# Patient Record
Sex: FEMALE | Race: OTHER | Employment: UNEMPLOYED | URBAN - METROPOLITAN AREA
[De-identification: names, ages, dates, MRNs, and addresses within clinical notes are randomized per-mention and may not be internally consistent; named-entity substitution may affect disease eponyms.]

---

## 2016-10-17 LAB
HBSAG, EXTERNAL: NEGATIVE
HIV, EXTERNAL: NON REACTIVE
RUBELLA, EXTERNAL: NORMAL
TYPE, ABO & RH, EXTERNAL: NORMAL

## 2016-10-26 LAB
CHLAMYDIA, EXTERNAL: NEGATIVE
N. GONORRHEA, EXTERNAL: NEGATIVE

## 2016-11-17 LAB
ANTIBODY SCREEN, EXTERNAL: NEGATIVE
T. PALLIDUM, EXTERNAL: NEGATIVE

## 2016-12-07 LAB — GRBS, EXTERNAL: POSITIVE

## 2017-01-05 ENCOUNTER — HOSPITAL ENCOUNTER (INPATIENT)
Age: 27
LOS: 2 days | Discharge: HOME | DRG: 560 | End: 2017-01-07
Attending: OBSTETRICS & GYNECOLOGY | Admitting: OBSTETRICS & GYNECOLOGY
Payer: MEDICAID

## 2017-01-05 PROBLEM — Z34.90 PREGNANCY: Status: ACTIVE | Noted: 2017-01-05

## 2017-01-05 LAB
BASOPHILS # BLD AUTO: 0 K/UL (ref 0–0.1)
BASOPHILS # BLD: 0 % (ref 0–1)
EOSINOPHIL # BLD: 0.1 K/UL (ref 0–0.4)
EOSINOPHIL NFR BLD: 1 % (ref 0–7)
ERYTHROCYTE [DISTWIDTH] IN BLOOD BY AUTOMATED COUNT: 14 % (ref 11.5–14.5)
HCT VFR BLD AUTO: 37.4 % (ref 35–47)
HGB BLD-MCNC: 12.8 G/DL (ref 11.5–16)
LYMPHOCYTES # BLD AUTO: 20 % (ref 12–49)
LYMPHOCYTES # BLD: 2.6 K/UL (ref 0.8–3.5)
MCH RBC QN AUTO: 30.5 PG (ref 26–34)
MCHC RBC AUTO-ENTMCNC: 34.2 G/DL (ref 30–36.5)
MCV RBC AUTO: 89.3 FL (ref 80–99)
MONOCYTES # BLD: 1 K/UL (ref 0–1)
MONOCYTES NFR BLD AUTO: 8 % (ref 5–13)
NEUTS SEG # BLD: 9 K/UL (ref 1.8–8)
NEUTS SEG NFR BLD AUTO: 71 % (ref 32–75)
PLATELET # BLD AUTO: 197 K/UL (ref 150–400)
RBC # BLD AUTO: 4.19 M/UL (ref 3.8–5.2)
WBC # BLD AUTO: 12.8 K/UL (ref 3.6–11)

## 2017-01-05 PROCEDURE — 74011000258 HC RX REV CODE- 258

## 2017-01-05 PROCEDURE — 74011250636 HC RX REV CODE- 250/636: Performed by: OBSTETRICS & GYNECOLOGY

## 2017-01-05 PROCEDURE — 65410000002 HC RM PRIVATE OB

## 2017-01-05 PROCEDURE — 75410000002 HC LABOR FEE PER 1 HR

## 2017-01-05 PROCEDURE — 77030021125

## 2017-01-05 PROCEDURE — 85025 COMPLETE CBC W/AUTO DIFF WBC: CPT | Performed by: OBSTETRICS & GYNECOLOGY

## 2017-01-05 PROCEDURE — 75410000000 HC DELIVERY VAGINAL/SINGLE

## 2017-01-05 PROCEDURE — 74011250637 HC RX REV CODE- 250/637: Performed by: OBSTETRICS & GYNECOLOGY

## 2017-01-05 PROCEDURE — 99281 EMR DPT VST MAYX REQ PHY/QHP: CPT

## 2017-01-05 PROCEDURE — 75410000003 HC RECOV DEL/VAG/CSECN EA 0.5 HR

## 2017-01-05 PROCEDURE — 74011250636 HC RX REV CODE- 250/636

## 2017-01-05 PROCEDURE — 36415 COLL VENOUS BLD VENIPUNCTURE: CPT | Performed by: OBSTETRICS & GYNECOLOGY

## 2017-01-05 RX ORDER — NALOXONE HYDROCHLORIDE 0.4 MG/ML
0.4 INJECTION, SOLUTION INTRAMUSCULAR; INTRAVENOUS; SUBCUTANEOUS AS NEEDED
Status: DISCONTINUED | OUTPATIENT
Start: 2017-01-05 | End: 2017-01-07 | Stop reason: HOSPADM

## 2017-01-05 RX ORDER — SODIUM CHLORIDE 0.9 % (FLUSH) 0.9 %
5-10 SYRINGE (ML) INJECTION AS NEEDED
Status: DISCONTINUED | OUTPATIENT
Start: 2017-01-05 | End: 2017-01-07 | Stop reason: HOSPADM

## 2017-01-05 RX ORDER — ACETAMINOPHEN 325 MG/1
650 TABLET ORAL
Status: DISCONTINUED | OUTPATIENT
Start: 2017-01-05 | End: 2017-01-07 | Stop reason: HOSPADM

## 2017-01-05 RX ORDER — OXYTOCIN/RINGER'S LACTATE 20/1000 ML
125-500 PLASTIC BAG, INJECTION (ML) INTRAVENOUS ONCE
Status: ACTIVE | OUTPATIENT
Start: 2017-01-05 | End: 2017-01-06

## 2017-01-05 RX ORDER — PROMETHAZINE HYDROCHLORIDE 25 MG/1
25 TABLET ORAL
Status: DISCONTINUED | OUTPATIENT
Start: 2017-01-05 | End: 2017-01-07 | Stop reason: HOSPADM

## 2017-01-05 RX ORDER — ZOLPIDEM TARTRATE 5 MG/1
5 TABLET ORAL
Status: DISCONTINUED | OUTPATIENT
Start: 2017-01-05 | End: 2017-01-07 | Stop reason: HOSPADM

## 2017-01-05 RX ORDER — SODIUM CHLORIDE, SODIUM LACTATE, POTASSIUM CHLORIDE, CALCIUM CHLORIDE 600; 310; 30; 20 MG/100ML; MG/100ML; MG/100ML; MG/100ML
125 INJECTION, SOLUTION INTRAVENOUS CONTINUOUS
Status: DISCONTINUED | OUTPATIENT
Start: 2017-01-05 | End: 2017-01-07 | Stop reason: HOSPADM

## 2017-01-05 RX ORDER — ADHESIVE BANDAGE
30 BANDAGE TOPICAL DAILY PRN
Status: DISCONTINUED | OUTPATIENT
Start: 2017-01-05 | End: 2017-01-07 | Stop reason: HOSPADM

## 2017-01-05 RX ORDER — SODIUM CHLORIDE 900 MG/100ML
INJECTION INTRAVENOUS
Status: COMPLETED
Start: 2017-01-05 | End: 2017-01-05

## 2017-01-05 RX ORDER — SODIUM CHLORIDE 0.9 % (FLUSH) 0.9 %
5-10 SYRINGE (ML) INJECTION EVERY 8 HOURS
Status: DISCONTINUED | OUTPATIENT
Start: 2017-01-05 | End: 2017-01-07 | Stop reason: HOSPADM

## 2017-01-05 RX ORDER — OXYCODONE AND ACETAMINOPHEN 5; 325 MG/1; MG/1
2 TABLET ORAL
Status: DISCONTINUED | OUTPATIENT
Start: 2017-01-05 | End: 2017-01-07 | Stop reason: HOSPADM

## 2017-01-05 RX ORDER — IBUPROFEN 400 MG/1
800 TABLET ORAL EVERY 8 HOURS
Status: DISCONTINUED | OUTPATIENT
Start: 2017-01-05 | End: 2017-01-07 | Stop reason: HOSPADM

## 2017-01-05 RX ORDER — HYDROCORTISONE ACETATE PRAMOXINE HCL 2.5; 1 G/100G; G/100G
CREAM TOPICAL AS NEEDED
Status: DISCONTINUED | OUTPATIENT
Start: 2017-01-05 | End: 2017-01-07 | Stop reason: HOSPADM

## 2017-01-05 RX ORDER — DIPHENHYDRAMINE HCL 25 MG
25 CAPSULE ORAL
Status: DISCONTINUED | OUTPATIENT
Start: 2017-01-05 | End: 2017-01-07 | Stop reason: HOSPADM

## 2017-01-05 RX ORDER — OXYTOCIN/RINGER'S LACTATE 20/1000 ML
PLASTIC BAG, INJECTION (ML) INTRAVENOUS
Status: COMPLETED
Start: 2017-01-05 | End: 2017-01-05

## 2017-01-05 RX ORDER — SIMETHICONE 80 MG
80 TABLET,CHEWABLE ORAL
Status: DISCONTINUED | OUTPATIENT
Start: 2017-01-05 | End: 2017-01-07 | Stop reason: HOSPADM

## 2017-01-05 RX ORDER — PENICILLIN G POTASSIUM 5000000 [IU]/1
INJECTION, POWDER, FOR SOLUTION INTRAMUSCULAR; INTRAVENOUS
Status: COMPLETED
Start: 2017-01-05 | End: 2017-01-05

## 2017-01-05 RX ADMIN — Medication 20000 MILLI-UNITS: at 20:45

## 2017-01-05 RX ADMIN — SODIUM CHLORIDE 100 ML: 900 INJECTION, SOLUTION INTRAVENOUS at 19:24

## 2017-01-05 RX ADMIN — OXYCODONE HYDROCHLORIDE AND ACETAMINOPHEN 2 TABLET: 5; 325 TABLET ORAL at 21:34

## 2017-01-05 RX ADMIN — SODIUM CHLORIDE, SODIUM LACTATE, POTASSIUM CHLORIDE, AND CALCIUM CHLORIDE 250 ML/HR: 600; 310; 30; 20 INJECTION, SOLUTION INTRAVENOUS at 19:15

## 2017-01-05 RX ADMIN — PENICILLIN G POTASSIUM 5 MILLION UNITS: 5000000 POWDER, FOR SOLUTION INTRAMUSCULAR; INTRAPLEURAL; INTRATHECAL; INTRAVENOUS at 19:24

## 2017-01-05 NOTE — IP AVS SNAPSHOT
Current Discharge Medication List  
  
Take these medications as needed Dose & Instructions Dispensing Information Comments Morning Noon Evening Bedtime  
 ibuprofen 800 mg tablet Commonly known as:  MOTRIN Your next dose is: Today, Tomorrow Other:  ____________ Dose:  800 mg Take 1 Tab by mouth every eight (8) hours as needed for Pain. Quantity:  30 Tab Refills:  0  
     
   
   
   
  
 oxyCODONE-acetaminophen 5-325 mg per tablet Commonly known as:  PERCOCET Your next dose is: Today, Tomorrow Other:  ____________ Dose:  1 Tab Take 1 Tab by mouth every four (4) hours as needed for Pain. Max Daily Amount: 6 Tabs. Quantity:  12 Tab Refills:  0 Take these medications as directed Dose & Instructions Dispensing Information Comments Morning Noon Evening Bedtime  
 prenatal multivit-ca-min-fe-fa Tab Your next dose is: Today, Tomorrow Other:  ____________ Take  by mouth. Indications: PREGNANCY Refills:  0 Where to Get Your Medications Information about where to get these medications is not yet available ! Ask your nurse or doctor about these medications  
  ibuprofen 800 mg tablet  
 oxyCODONE-acetaminophen 5-325 mg per tablet

## 2017-01-05 NOTE — IP AVS SNAPSHOT
[address] [facility] 
[phone] Patient: Tammy Boone MRN: [id number] XXZ:[id number] You are allergic to the following No active allergies Recent Documentation Height Weight Breastfeeding? BMI OB Status Smoking Status 1.6 m 74.4 kg Yes 29.05 kg/m2 Pregnant Never Smoker Unresulted Labs Order Current Status SAMPLE TO BLOOD BANK In process Emergency Contacts Name Discharge Info Relation Home Work [address] [place]   [phone] About your hospitalization You were admitted on:  January 5, 2017 You last received care in the:  [facility] LABOR [facility] DELIVERY You were discharged on:  January 7, 2017 Unit phone number:  [phone] Why you were hospitalized Your primary diagnosis was:  Not on File Your diagnoses also included:  Labor And Delivery, Indication For Care, Pregnancy Providers Seen During Your Hospitalizations Provider Role Specialty Primary office phone Zayda Bae MD Attending Provider Obstetrics & Gynecology [phone] Poonam Hua MD Attending Provider Obstetrics & Gynecology [phone] Mendel Can, MD Attending Provider Obstetrics & Gynecology [phone] Your Primary Care Physician (PCP) Primary Care Physician Office Phone Office Fax Tylor Cooper [phone] Follow-up Information Follow up With Details Comments Contact Info Dr. John William In 6 weeks Brando Castorena MD   [address] [place] [address] 
[phone] Current Discharge Medication List  
  
START taking these medications Dose & Instructions Dispensing Information Comments Morning Noon Evening Bedtime  
 ibuprofen 800 mg tablet Commonly known as:  MOTRIN Your next dose is: Today, Tomorrow Other:  _________  Dose:  800 mg  
 Take 1 Tab by mouth every eight (8) hours as needed for Pain. Quantity:  30 Tab Refills:  0  
     
   
   
   
  
 oxyCODONE-acetaminophen 5-325 mg per tablet Commonly known as:  PERCOCET Your next dose is: Today, Tomorrow Other:  _________ Dose:  1 Tab Take 1 Tab by mouth every four (4) hours as needed for Pain. Max Daily Amount: 6 Tabs. Quantity:  12 Tab Refills:  0 CONTINUE these medications which have NOT CHANGED Dose & Instructions Dispensing Information Comments Morning Noon Evening Bedtime  
 prenatal multivit-ca-min-fe-fa Tab Your next dose is: Today, Tomorrow Other:  _________ Take  by mouth. Indications: PREGNANCY Refills:  0 Where to Get Your Medications Information on where to get these meds will be given to you by the nurse or doctor. ! Ask your nurse or doctor about these medications  
  ibuprofen 800 mg tablet  
 oxyCODONE-acetaminophen 5-325 mg per tablet Discharge Instructions POST DELIVERY DISCHARGE INSTRUCTIONS Name: Jennifer Pacheco YOB: 1990 Primary Diagnosis: Active Problems: 
  Labor and delivery, indication for care (1/5/2017) Pregnancy (1/5/2017) General:  
 
Diet/Diet Restrictions: 
· Eight 8-ounce glasses of fluid daily (water, juices); avoid excessive caffeine intake. · Meals/snacks as desired which are high in fiber and carbohydrates and low in fat and cholesterol. Physical Activity / Restrictions / Safety: · Avoid heavy lifting, no more that 8 lbs. For 2-3 weeks;  
· Limit use of stairs to 2 times daily for the first week home. · No driving for one week. · Avoid intercourse 4-6 weeks, no douching or tampon use. · Check with obstetrician before starting or resuming an exercise program.   
 
Discharge Instructions/Special Treatment/Home Care Needs:  
 
· Continue prenatal vitamins. · Continue to use squirt bottle with warm water on your episiotomy after each bathroom use until bleeding stops. · If steri-strips applied to your incision, remove in 7-10 days. Call your doctor for the following: · Fever over 101 degrees by mouth. · Vaginal bleeding heavier than a normal menstrual period or clots larger than a golf ball. · Red streaks or increased swelling of legs, painful red streaks on your breast. 
· Painful urination, constipation and increased pain or swelling or discharge with your incision. · If you feel extremely anxious or overwhelmed. · If you have thoughts of harming yourself and/or your baby. Pain Management:  
 
· Take Acetaminophen (Tylenol) or Ibuprofen (Advil, Motrin), as directed for pain. · Use a warm Sitz bath 3 times daily to relieve episiotomy or hemorrhoidal discomfort. · For hemorrhoidal discomfort, use Tucks and Anusol cream as needed and directed. · Heating pad to  incision as needed. Follow-Up Care:  
 
Appointment with MD: No orders of the defined types were placed in this encounter. Telephone number: [phone] Signed By: Alejandrina Lopes MD                                                                                                   Date: 2017 Time: 10:17 AM 
 
 
Personal Medicine Activation Thank you for requesting access to Personal Medicine. Please follow the instructions below to securely access and download your online medical record. Personal Medicine allows you to send messages to your doctor, view your test results, renew your prescriptions, schedule appointments, and more. How Do I Sign Up? 1. In your internet browser, go to [email or web address] 
2. Click on the First Time User? Click Here link in the Sign In box. You will be redirect to the New Member Sign Up page. 3. Enter your Personal Medicine Access Code exactly as it appears below. You will not need to use this code after youve completed the sign-up process.  If you do not sign up before the expiration date, you must request a new code. [employer] Access Code: [email or web address] Expires: 1/15/2017  6:42 PM (This is the date your [employer]o access code will ) 4. Enter the last four digits of your Social Security Number (xxxx) and Date of Birth (mm/dd/yyyy) as indicated and click Submit. You will be taken to the next sign-up page. 5. Create a Koduco ID. This will be your [employer]o login ID and cannot be changed, so think of one that is secure and easy to remember. 6. Create a Koduco password. You can change your password at any time. 7. Enter your Password Reset Question and Answer. This can be used at a later time if you forget your password. 8. Enter your e-mail address. You will receive e-mail notification when new information is available in [address] 9. Click Sign Up. You can now view and download portions of your medical record. 10. Click the Download Summary menu link to download a portable copy of your medical information. Additional Information If you have questions, please visit the Frequently Asked Questions section of the Koduco website at [email or web address] "[email or web address] Remember, Koduco is NOT to be used for urgent needs. For medical emergencies, dial 911. Discharge Orders None [employer]uco Announcement We are excited to announce that we are making your provider's discharge notes available to you in Koduco. You will see these notes when they are completed and signed by the physician that discharged you from your recent hospital stay. If you have any questions or concerns about any information you see in Koduco, please call the Health Information Department where you were seen or reach out to your Primary Care Provider for more information about your plan of care. Introducing [facility]!    
 Tamika Terry introduces Koduco patient portal. Now you can access parts of your medical record, email your doctor's office, and request medication refills online. 1. In your internet browser, go to [email or web address] 2. Click on the First Time User? Click Here link in the Sign In box. You will see the New Member Sign Up page. 3. Enter your [employer] Access Code exactly as it appears below. You will not need to use this code after youve completed the sign-up process. If you do not sign up before the expiration date, you must request a new code. · [employer] Access Code: [email or web address] Expires: 1/15/2017  6:42 PM 
 
4. Enter the last four digits of your Social Security Number (xxxx) and Date of Birth (mm/dd/yyyy) as indicated and click Submit. You will be taken to the next sign-up page. 5. Create a [employer] ID. This will be your [employer] login ID and cannot be changed, so think of one that is secure and easy to remember. 6. Create a [employer] password. You can change your password at any time. 7. Enter your Password Reset Question and Answer. This can be used at a later time if you forget your password. 8. Enter your e-mail address. You will receive e-mail notification when new information is available in [address] 9. Click Sign Up. You can now view and download portions of your medical record. 10. Click the Download Summary menu link to download a portable copy of your medical information. If you have questions, please visit the Frequently Asked Questions section of the [employer] website. Remember, [employer] is NOT to be used for urgent needs. For medical emergencies, dial 911. Now available from your iPhone and Android! General Information Please provide this summary of care documentation to your next provider. Patient Signature:  ____________________________________________________________ Date:  ____________________________________________________________  
  
Janyth Mins  Provider Signature: ____________________________________________________________ Date:  ____________________________________________________________

## 2017-01-06 PROCEDURE — 65410000002 HC RM PRIVATE OB

## 2017-01-06 PROCEDURE — 74011250637 HC RX REV CODE- 250/637: Performed by: OBSTETRICS & GYNECOLOGY

## 2017-01-06 RX ADMIN — IBUPROFEN 800 MG: 400 TABLET ORAL at 01:09

## 2017-01-06 RX ADMIN — IBUPROFEN 800 MG: 400 TABLET ORAL at 10:36

## 2017-01-06 RX ADMIN — OXYCODONE HYDROCHLORIDE AND ACETAMINOPHEN 2 TABLET: 5; 325 TABLET ORAL at 13:24

## 2017-01-06 RX ADMIN — VITAMIN A ACETATE, .BETA.-CAROTENE, ASCORBIC ACID, CHOLECALCIFEROL, .ALPHA.-TOCOPHEROL ACETATE, DL-, THIAMINE MONONITRATE, RIBOFLAVIN, NIACINAMIDE, PYRIDOXINE HYDROCHLORIDE, FOLIC ACID, CYANOCOBALAMIN, CALCIUM CARBONATE, FERROUS FUMARATE, ZINC OXIDE, AND CUPRIC OXIDE 1 TABLET: 2000; 2000; 120; 400; 22; 1.84; 3; 20; 10; 1; 12; 200; 27; 25; 2 TABLET ORAL at 10:36

## 2017-01-06 RX ADMIN — IBUPROFEN 800 MG: 400 TABLET ORAL at 18:43

## 2017-01-06 NOTE — ROUTINE PROCESS
TRANSFER - IN REPORT:    Verbal report received from HILARIO Rome(name) on Ah M Gonzalo  being received from L&D(unit) for routine progression of care      Report consisted of patients Situation, Background, Assessment and   Recommendations(SBAR). Information from the following report(s) SBAR was reviewed with the receiving nurse. Opportunity for questions and clarification was provided. Assessment completed upon patients arrival to unit and care assumed.

## 2017-01-06 NOTE — PROGRESS NOTES
Post-Partum Day Number 1 Progress Note    Freddie Sánchez     Assessment: Doing well, post partum day 1    Plan:  1. Continue routine postpartum and perineal care as well as maternal education. 2. Declines circ    Information for the patient's :  Harrison Rosario mi [[id number]]   Vaginal, Spontaneous Delivery   Patient doing well without significant complaint. Voiding without difficulty, normal lochia. Vitals:  Visit Vitals    /51 (BP 1 Location: Right arm, BP Patient Position: Sitting)    Pulse 70    Temp 98.6 °F (37 °C)    Resp 16    Ht 5' 3\" (1.6 m)    Wt 74.4 kg (164 lb)    LMP 03/10/2016 (Exact Date)    SpO2 99%    BMI 29.05 kg/m2     Temp (24hrs), Av.3 °F (36.8 °C), Min:97.9 °F (36.6 °C), Max:98.7 °F (37.1 °C)        Exam:   Patient without distress. Abdomen soft, fundus firm, nontender                Perineum with normal lochia noted. Lower extremities are negative for swelling, cords or tenderness. Labs:     Lab Results   Component Value Date/Time    WBC 12.8 2017 07:28 PM    WBC 12.5 10/17/2016 07:31 PM    WBC 8.4 2013 12:03 PM    HGB 12.8 2017 07:28 PM    HGB 12.1 10/17/2016 07:31 PM    HGB 13.6 2013 12:03 PM    HCT 37.4 2017 07:28 PM    HCT 36.2 10/17/2016 07:31 PM    HCT 40.3 2013 12:03 PM    PLATELET 894 73/10/8853 07:28 PM    PLATELET 919  07:31 PM    PLATELET 309  12:03 PM       Recent Results (from the past 24 hour(s))   CBC WITH AUTOMATED DIFF    Collection Time: 17  7:28 PM   Result Value Ref Range    WBC 12.8 (H) 3.6 - 11.0 K/uL    RBC 4.19 3.80 - 5.20 M/uL    HGB 12.8 11.5 - 16.0 g/dL    HCT 37.4 35.0 - 47.0 %    MCV 89.3 80.0 - 99.0 FL    MCH 30.5 26.0 - 34.0 PG    MCHC 34.2 30.0 - 36.5 g/dL    RDW 14.0 11.5 - 14.5 %    PLATELET 600 500 - 908 K/uL    NEUTROPHILS 71 32 - 75 %    LYMPHOCYTES 20 12 - 49 %    MONOCYTES 8 5 - 13 %    EOSINOPHILS 1 0 - 7 %    BASOPHILS 0 0 - 1 %    ABS. NEUTROPHILS 9.0 (H) 1.8 - 8.0 K/UL    ABS. LYMPHOCYTES 2.6 0.8 - 3.5 K/UL    ABS. MONOCYTES 1.0 0.0 - 1.0 K/UL    ABS. EOSINOPHILS 0.1 0.0 - 0.4 K/UL    ABS.  BASOPHILS 0.0 0.0 - 0.1 K/UL

## 2017-01-06 NOTE — LACTATION NOTE
This note was copied from a baby's chart. Infant has  8 times since birth. Pointed out to Dr. Marilyn Dickey and Evie Kerns Rn history of TB exposure noted by Adolfo Tapia RN on Oct. 16th, 2016. Used PowWowHR  [id number] and Mom states she had 6 month course of medication 2 years ago when [place] came [place] this [place] and made Evie Kerns RN aware. Mom asking for formula as she does not feel infant is satisfied. Reviewed good feeds and output and infant voiding and stooling. Latch scores of 8 and 9 noted. Evie Kerns RN, IBCLC has taken care of all day and assisted with breastfeeds.

## 2017-01-06 NOTE — H&P
EDC:2017  EGA: 40 weeks, 5 days      History of Present Illness:  (Patient does not speak English--spouse speaks very little [place])  Patient presents with onset contractions early this afternoon, much increased in the last couple of hours. No bleeding, ROM. Baby moving. Patient's Prenatal Care with Doctor of Record Sukhdeep Tinoco MD Notable For -    GBS positive RX in labor  High risk pregnancy insufficient PNC- 1st visit 30 weeks   lab screening  Language barrier:   is       Review of Systems     Except as noted in the HPI, the review of systems is negative for General, Breast, , CV, Resp, GI, Endo, MS, Derm, Neuro, Psych, Eyes, ENT, Allergy and Heme.     Allergies      Medications Removed from Medication List        Flowsheet View for Follow-up Visit     Estimated weeks of        gestation:  40 4/7     Blood pressure: 110 / 70     FHR:   145     Fetal position:  vertex     Cx Dilation:  8cm     Cx Effacement: 100%     Cx Station:  -1     Comment:  Admitted in active labor      Vital Signs    Blood Pressure: 110 / 70Pulse rate: 74 / minute  Resp rate: 16 / minute    FHT Descrip:    reactive  Contractions:  yes            Physical Exam     General           General appearance:  no acute distress    Neurological           Reflexes:  2+ and symmetric with no pathological reflexes    Abdomen           Abdomen:  gravid          EFW:  7#                  Dilation: : 8cm                  Effacement:  100%                  Station:  -1                  Presentation:  vertex                  Membranes:  intact                  Pelvis:  adequate                  Consistency:  soft                  Position: anterior            Impression & Recommendations:    Problem # 1:  Labor term active (ICD-650) (NHF16-L27)  Assessment: New  Admit L&D  GBS prophylaxis  Plan     Other Orders:  Sent to L&D (CPT-AdmitF)      Medications (at conclusion of this visit)    2014 PRENATAL VITAMINS TABS (PRENATAL MV & MIN W/FE-FA TABS) Prescribed by non C MD          LABORATORY DATA   TEST DATE RESULT   Group B Strep culture 12/07/2016 Positive                                   (Group B Strep Culture Result Field)   Blood Type 10/17/2016 O                                             (Blood Type Result Field)   Rh 10/17/2016 Positive                                   (Rh Result Field)   Rhogam Inj Given 02/15/2015 *   Tdap Vaccine Given 11/17/2016 Vacc.  [address]   Antibody Screen 11/17/2016 Negative   Rubella  [facility] Reference Ranges On or After 3/10/14                  <0.90              Non-immune      0.90 - 0.99     Equivocal      >0.99              Immune    [facility] Reference Ranges  Before 3/10/14           <5                 Non-immune             5 - 9               Equivocal            >9                 Immune  [facility] Reference Ranges       < Or = 0.90       Negative             0.91-1.09          Equivocal            > Or = 1.10       Positive   10/17/2016     235.50     TPA (T Pallidum Antibodies) 11/17/2016 Negative   Serology (RPR) 02/15/2015 *   HBsAg 10/17/2016 Negative   HIV 10/17/2016 Non Reactive   Hemoglobin 11/17/2016 12.8   Hematocrit 11/17/2016 37.7   Platelets 39/72/3037 248 X10E3/UL   TSH 02/15/2015 *   Urine Culture 10/26/2016 Negative   GC DNA Probe 10/26/2016 Negative   Chlamydia DNA 10/26/2016 Negative   PAP 10/17/2016 Deferred   Flu Vaccine Given 11/01/2016 declined   HGBA1C 02/15/2015 *   HGB Electro 11/04/2014 AA   T4, Free 02/15/2015 *   BG Fasting 02/15/2015 *   GTT 1H 50G 11/17/2016 87   GTT 1H 100G 02/15/2015 *   GTT 2H 100G 02/15/2015 *   GTT 3H 100G 02/15/2015 *   Glucose Plasma 02/15/2015 *   CF Accept or Decline 10/17/2016 Declined   CF Screen Result 10/17/2016 Declined   Nuchal Trans 10/17/2016 Too Late   AFP Only 10/17/2016 Too Late   Tetra 10/17/2016 Too Late   AFP Serum 02/15/2015 *   CVS 02/15/2015 *   AFP Amniotic 02/15/2015 *   Amnio Karyo 02/15/2015 *   FISH 02/15/2015 *   GC Culture 02/15/2015 *   Chlamydia Cult 02/15/2015 *   Ureaplasma     Mycoplasma     WBC 10/17/2016 12.5   RBC 10/17/2016 4.01   MCV 02/15/2015 *   MCH 02/15/2015 *   MCHC RBC 02/15/2015 *     ULTRASOUND DATA   TEST DATE RESULT   Estimated Fetal Weight 10/26/2016 1475.398999                                     Weight % 10/26/2016 22nd%%                                                SARAH 10/26/2016 15.107717                    BPP     Cervical Length (mm)             Electronically signed by Pari Kruger MD  on 01/06/2017 at 6:46 AM    ________________________________________________________________________

## 2017-01-06 NOTE — PROGRESS NOTES
Pt of [address] here c/o regular, painful uc's since this morning. Pt speaks hua Trevino,  at bedside. Will use  phone. Pt was seen here in 10/17/16, scan at this time estimated [facility] 12/15/16. Pt being followed at [address] by Dr. Maksim Nicole, late [facility] 17. . NKDA. 1900-Pt transferred to [facility][id number] via stretcher. Pt  denies ROM. Reports +FM.

## 2017-01-06 NOTE — ROUTINE PROCESS
Bedside shift change report given to SUKUMAR Ferrell (oncoming nurse) by Kofi Yuan (offgoing nurse). Report included the following information SBAR.

## 2017-01-07 VITALS
SYSTOLIC BLOOD PRESSURE: 104 MMHG | OXYGEN SATURATION: 99 % | WEIGHT: 164 LBS | DIASTOLIC BLOOD PRESSURE: 52 MMHG | HEIGHT: 63 IN | BODY MASS INDEX: 29.06 KG/M2 | HEART RATE: 77 BPM | TEMPERATURE: 98 F | RESPIRATION RATE: 19 BRPM

## 2017-01-07 PROCEDURE — 74011250637 HC RX REV CODE- 250/637: Performed by: OBSTETRICS & GYNECOLOGY

## 2017-01-07 RX ORDER — OXYCODONE AND ACETAMINOPHEN 5; 325 MG/1; MG/1
1 TABLET ORAL
Qty: 12 TAB | Refills: 0 | Status: SHIPPED | OUTPATIENT
Start: 2017-01-07

## 2017-01-07 RX ORDER — IBUPROFEN 800 MG/1
800 TABLET ORAL
Qty: 30 TAB | Refills: 0 | Status: SHIPPED | OUTPATIENT
Start: 2017-01-07

## 2017-01-07 RX ADMIN — OXYCODONE HYDROCHLORIDE AND ACETAMINOPHEN 2 TABLET: 5; 325 TABLET ORAL at 08:57

## 2017-01-07 RX ADMIN — IBUPROFEN 800 MG: 400 TABLET ORAL at 04:20

## 2017-01-07 RX ADMIN — VITAMIN A ACETATE, .BETA.-CAROTENE, ASCORBIC ACID, CHOLECALCIFEROL, .ALPHA.-TOCOPHEROL ACETATE, DL-, THIAMINE MONONITRATE, RIBOFLAVIN, NIACINAMIDE, PYRIDOXINE HYDROCHLORIDE, FOLIC ACID, CYANOCOBALAMIN, CALCIUM CARBONATE, FERROUS FUMARATE, ZINC OXIDE, AND CUPRIC OXIDE 1 TABLET: 2000; 2000; 120; 400; 22; 1.84; 3; 20; 10; 1; 12; 200; 27; 25; 2 TABLET ORAL at 08:57

## 2017-01-07 NOTE — DISCHARGE SUMMARY
Obstetrical Discharge Summary     Name: Ashleigh Sanchez MRN: [id number]  SSN: [id number]    YOB: 1990  Age: 31 y.o. Sex: female      Admit Date: 2017    Discharge Date: 2017     Admitting Physician: Julisa Chaney MD     Attending Physician:  Ag Marcano MD     Admission Diagnoses: hayley  Labor and delivery, indication for care  Pregnancy    Procedure Performed:    Discharge Diagnoses:   Information for the patient's :  Lillian Irby [[id number]]   Delivery of a 3.425 kg male infant via Vaginal, Spontaneous Delivery on 2017 at 8:38 PM  by . Apgars were 8 and 9. Additional Diagnoses:   Hospital Problems  Never Reviewed          Codes Class Noted POA    Labor and delivery, indication for care ICD-10-CM: O75.9  ICD-9-CM: 659.90  2017 Unknown        Pregnancy ICD-10-CM: Z33.1  ICD-9-CM: V22.2  2017 Unknown             Lab Results   Component Value Date/Time    Rubella, External Immune 10/17/2016    GrBStrep, External Positive 2016       Hospital Course: Normal hospital course following the delivery. Patient Disposition: Home      Followup Care:  Discharge Condition: good  Activity as tolerated, No sex for 6 weeks and No driving while on analgesics  Regular Diet  Keep wound clean and dry and Ice to area for comfort    Patient Instructions:   Current Discharge Medication List      START taking these medications    Details   ibuprofen (MOTRIN) 800 mg tablet Take 1 Tab by mouth every eight (8) hours as needed for Pain. Qty: 30 Tab, Refills: 0      oxyCODONE-acetaminophen (PERCOCET) 5-325 mg per tablet Take 1 Tab by mouth every four (4) hours as needed for Pain. Max Daily Amount: 6 Tabs. Qty: 12 Tab, Refills: 0         CONTINUE these medications which have NOT CHANGED    Details   prenatal multivit-ca-min-fe-fa tab Take  by mouth. Indications: PREGNANCY             Reference my discharge instructions.     Follow-up Appointments   Procedures    FOLLOW UP VISIT Appointment in: 6 Weeks     Standing Status:   Standing     Number of Occurrences:   1     Order Specific Question:   Appointment in     Answer:   6 Weeks        Signed By:  Robina Olmedo MD     January 7, 2017

## 2017-01-07 NOTE — DISCHARGE INSTRUCTIONS
POST DELIVERY DISCHARGE INSTRUCTIONS    Name: Freddie Li  YOB: 1990  Primary Diagnosis: Active Problems:    Labor and delivery, indication for care (2017)      Pregnancy (2017)        General:     Diet/Diet Restrictions:  · Eight 8-ounce glasses of fluid daily (water, juices); avoid excessive caffeine intake. · Meals/snacks as desired which are high in fiber and carbohydrates and low in fat and cholesterol. Physical Activity / Restrictions / Safety:     · Avoid heavy lifting, no more that 8 lbs. For 2-3 weeks;   · Limit use of stairs to 2 times daily for the first week home. · No driving for one week. · Avoid intercourse 4-6 weeks, no douching or tampon use. · Check with obstetrician before starting or resuming an exercise program.      Discharge Instructions/Special Treatment/Home Care Needs:     · Continue prenatal vitamins. · Continue to use squirt bottle with warm water on your episiotomy after each bathroom use until bleeding stops. · If steri-strips applied to your incision, remove in 7-10 days. Call your doctor for the following:     · Fever over 101 degrees by mouth. · Vaginal bleeding heavier than a normal menstrual period or clots larger than a golf ball. · Red streaks or increased swelling of legs, painful red streaks on your breast.  · Painful urination, constipation and increased pain or swelling or discharge with your incision. · If you feel extremely anxious or overwhelmed. · If you have thoughts of harming yourself and/or your baby. Pain Management:     · Take Acetaminophen (Tylenol) or Ibuprofen (Advil, Motrin), as directed for pain. · Use a warm Sitz bath 3 times daily to relieve episiotomy or hemorrhoidal discomfort. · For hemorrhoidal discomfort, use Tucks and Anusol cream as needed and directed. · Heating pad to  incision as needed.      Follow-Up Care:     Appointment with MD: No orders of the defined types were placed in this encounter. Telephone number: [phone]    Signed By: Gabe Cooper MD                                                                                                   Date: 2017 Time: 10:17 AM      [employer] Activation    Thank you for requesting access to [address] Please follow the instructions below to securely access and download your online medical record. [employer] allows you to send messages to your doctor, view your test results, renew your prescriptions, schedule appointments, and more. How Do I Sign Up? 1. In your internet browser, go to [email or web address]  2. Click on the First Time User? Click Here link in the Sign In box. You will be redirect to the New Member Sign Up page. 3. Enter your [employer] Access Code exactly as it appears below. You will not need to use this code after youve completed the sign-up process. If you do not sign up before the expiration date, you must request a new code. [employer] Access Code: [email or web address]  Expires: 1/15/2017  6:42 PM (This is the date your [employer] access code will )    4. Enter the last four digits of your Social Security Number (xxxx) and Date of Birth (mm/dd/yyyy) as indicated and click Submit. You will be taken to the next sign-up page. 5. Create a [employer] ID. This will be your [employer] login ID and cannot be changed, so think of one that is secure and easy to remember. 6. Create a [employer] password. You can change your password at any time. 7. Enter your Password Reset Question and Answer. This can be used at a later time if you forget your password. 8. Enter your e-mail address. You will receive e-mail notification when new information is available in [address] 9. Click Sign Up. You can now view and download portions of your medical record. 10. Click the Download Summary menu link to download a portable copy of your medical information.     Additional Information    If you have questions, please visit the Frequently Asked Questions section of the [employer] website at [email or web address] Remember, [employer] is NOT to be used for urgent needs. For medical emergencies, dial 911.

## 2017-01-07 NOTE — DISCHARGE SUMMARY
Obstetrical Discharge Summary     Name: Ashleigh Sanchez MRN: [id number]  SSN: [id number]    YOB: 1990  Age: 31 y.o. Sex: female      Admit Date: 2017    Discharge Date: 2017     Admitting Physician: Julisa Chaney MD     Attending Physician:  Ag Marcano MD     Admission Diagnoses: hayley  Labor and delivery, indication for care  Pregnancy    Procedure Performed:      Discharge Diagnoses:   Information for the patient's :  Lillian Manner [[id number]]   Delivery of a 3.425 kg male infant via Vaginal, Spontaneous Delivery on 2017 at 8:38 PM  by . Apgars were 8 and 9. Additional Diagnoses:   Hospital Problems  Never Reviewed          Codes Class Noted POA    Labor and delivery, indication for care ICD-10-CM: O75.9  ICD-9-CM: 659.90  2017 Unknown        Pregnancy ICD-10-CM: Z33.1  ICD-9-CM: V22.2  2017 Unknown             Lab Results   Component Value Date/Time    Rubella, External Immune 10/17/2016    GrBStrep, External Positive 2016       Hospital Course: Normal hospital course following the delivery. Patient Disposition: Home      Followup Care:  Discharge Condition: good  Activity as tolerated, No sex for 6 weeks and No driving while on analgesics  Regular Diet  Keep wound clean and dry    Patient Instructions:   Current Discharge Medication List      CONTINUE these medications which have NOT CHANGED    Details   prenatal multivit-ca-min-fe-fa tab Take  by mouth. Indications: PREGNANCY             Reference my discharge instructions. No orders of the defined types were placed in this encounter.        Signed By:  Gwen Foss MD     2017

## 2017-01-07 NOTE — PROGRESS NOTES
Admission completed w/use of  [facility]) phone.  #[phone]. HX benign. Pt was exposed to TB as a child but upon entrance to [address] [facility], she was treated with medication.

## 2017-01-07 NOTE — ROUTINE PROCESS
Bedside shift change report given to RONNY Yuan (oncoming nurse) by Neuravi. Mariaelena (offgoing nurse). Report included the following information SBAR.

## 2017-01-07 NOTE — PROGRESS NOTES
Post-Partum Day Number 2 Progress Note    Freddie Sánchez     Assessment: Doing well, post partum day 2    Plan:   1. Discharge home today  2. Follow up in office in 6 weeks with Anna Padilla MD  3. Post partum activity advised, diet as tolerated  4. Discharge Medications: ibuprofen, percocet and medications prior to admission    Information for the patient's :  Katherine Longoria mi [[id number]]   Vaginal, Spontaneous Delivery   Patient doing well without significant complaint. Voiding without difficulty, normal lochia. Vitals:  Visit Vitals    /52 (BP 1 Location: Right arm, BP Patient Position: At rest)    Pulse 77    Temp 98 °F (36.7 °C)    Resp 19    Ht 5' 3\" (1.6 m)    Wt 74.4 kg (164 lb)    LMP 03/10/2016 (Exact Date)    SpO2 99%    Breastfeeding Yes    BMI 29.05 kg/m2     Temp (24hrs), Av.1 °F (36.7 °C), Min:97.9 °F (36.6 °C), Max:98.6 °F (37 °C)      Exam:         Patient without distress. Abdomen soft, fundus firm, nontender                 Lower extremities are negative for swelling, cords or tenderness. Labs:     Lab Results   Component Value Date/Time    WBC 12.8 2017 07:28 PM    WBC 12.5 10/17/2016 07:31 PM    WBC 8.4 2013 12:03 PM    HGB 12.8 2017 07:28 PM    HGB 12.1 10/17/2016 07:31 PM    HGB 13.6 2013 12:03 PM    HCT 37.4 2017 07:28 PM    HCT 36.2 10/17/2016 07:31 PM    HCT 40.3 2013 12:03 PM    PLATELET 844  07:28 PM    PLATELET 713  07:31 PM    PLATELET 222  12:03 PM       No results found for this or any previous visit (from the past 24 hour(s)).

## 2017-01-07 NOTE — PROGRESS NOTES
Discharge education and instructions reviewed with patient via  #[id number]. Patient verbalized understanding of all instructions. Prescriptions given and explained. Copy of discharge instructions signed and placed on chart. Patient discharged to home with infant. Assisted to hospital exit by nurse, accompanied by patient's spouse.

## 2017-01-07 NOTE — ROUTINE PROCESS
Bedside shift change report given to SUKUMAR Ferrell (oncoming nurse) by Laxmi Yuan (offgoing nurse). Report included the following information SBAR.

## 2018-09-06 NOTE — L&D DELIVERY NOTE
Delivery Summary  Patient:  Freddie Goodson Larry                 Information for the patient's :  John Moore [[id number]]     Delivery Type: Vaginal, Spontaneous Delivery   Delivery Date: 2017   Delivery Time: 8:38 PM     Birth Weight: 3.425 kg     Sex:  male  Delivery Clinician:  Georgena Ormond   Gestational Age: Gestational Age: 36w2d    Presentation: Vertex   Position: Middle  Occiput  Anterior     Apgars were 8  and 9      Resuscitation Method: Tactile Stimulation     Meconium Stained: None  Living Status: Yes       Placenta Date/Time:     Placenta Removal: Spontaneous   Placenta Appearance: Vertex [1]     Cord Information: 3 Vessels    Cord Events: None       Cord Blood Sent?:  Yes    Blood Gases Sent?:  No     Episiotomy: None   Laceration(s): None      Estimated Blood Loss (ml): 300    Labor Events  Method: None       Augmentation: None   Cervical Ripening:       None
No

## 2022-03-18 PROBLEM — Z34.90 PREGNANCY: Status: ACTIVE | Noted: 2017-01-05

## 2023-09-14 ENCOUNTER — HOSPITAL ENCOUNTER (EMERGENCY)
Facility: HOSPITAL | Age: 33
Discharge: HOME | End: 2023-09-14
Attending: EMERGENCY MEDICINE
Payer: MEDICAID

## 2023-09-14 ENCOUNTER — APPOINTMENT (OUTPATIENT)
Facility: HOSPITAL | Age: 33
End: 2023-09-14
Payer: MEDICAID

## 2023-09-14 VITALS
OXYGEN SATURATION: 94 % | DIASTOLIC BLOOD PRESSURE: 76 MMHG | RESPIRATION RATE: 20 BRPM | TEMPERATURE: 98.2 F | SYSTOLIC BLOOD PRESSURE: 118 MMHG | HEART RATE: 72 BPM | WEIGHT: 170.42 LBS | BODY MASS INDEX: 30.2 KG/M2 | HEIGHT: 63 IN

## 2023-09-14 DIAGNOSIS — R10.2 ACUTE SUPRAPUBIC PAIN: ICD-10-CM

## 2023-09-14 DIAGNOSIS — T78.40XA ACUTE ALLERGIC REACTION, INITIAL ENCOUNTER: Primary | ICD-10-CM

## 2023-09-14 LAB
ALBUMIN SERPL-MCNC: 3.7 G/DL (ref 3.5–5)
ALBUMIN/GLOB SERPL: 1 (ref 1.1–2.2)
ALP SERPL-CCNC: 48 U/L (ref 45–117)
ALT SERPL-CCNC: 32 U/L (ref 12–78)
ANION GAP SERPL CALC-SCNC: 7 MMOL/L (ref 5–15)
AST SERPL-CCNC: 21 U/L (ref 15–37)
BASOPHILS # BLD: 0.1 K/UL (ref 0–0.1)
BASOPHILS NFR BLD: 0 % (ref 0–1)
BILIRUB SERPL-MCNC: 0.6 MG/DL (ref 0.2–1)
BUN SERPL-MCNC: 17 MG/DL (ref 6–20)
BUN/CREAT SERPL: 25 (ref 12–20)
CALCIUM SERPL-MCNC: 9.3 MG/DL (ref 8.5–10.1)
CHLORIDE SERPL-SCNC: 108 MMOL/L (ref 97–108)
CO2 SERPL-SCNC: 24 MMOL/L (ref 21–32)
CREAT SERPL-MCNC: 0.68 MG/DL (ref 0.55–1.02)
DIFFERENTIAL METHOD BLD: ABNORMAL
EOSINOPHIL # BLD: 0.1 K/UL (ref 0–0.4)
EOSINOPHIL NFR BLD: 1 % (ref 0–7)
ERYTHROCYTE [DISTWIDTH] IN BLOOD BY AUTOMATED COUNT: 12.6 % (ref 11.5–14.5)
GLOBULIN SER CALC-MCNC: 3.6 G/DL (ref 2–4)
GLUCOSE SERPL-MCNC: 136 MG/DL (ref 65–100)
HCG SERPL QL: NEGATIVE
HCT VFR BLD AUTO: 43.3 % (ref 35–47)
HGB BLD-MCNC: 14.6 G/DL (ref 11.5–16)
IMM GRANULOCYTES # BLD AUTO: 0 K/UL (ref 0–0.04)
IMM GRANULOCYTES NFR BLD AUTO: 0 % (ref 0–0.5)
LIPASE SERPL-CCNC: 109 U/L (ref 73–393)
LYMPHOCYTES # BLD: 5 K/UL (ref 0.8–3.5)
LYMPHOCYTES NFR BLD: 41 % (ref 12–49)
MCH RBC QN AUTO: 29 PG (ref 26–34)
MCHC RBC AUTO-ENTMCNC: 33.7 G/DL (ref 30–36.5)
MCV RBC AUTO: 85.9 FL (ref 80–99)
MONOCYTES # BLD: 0.9 K/UL (ref 0–1)
MONOCYTES NFR BLD: 8 % (ref 5–13)
NEUTS SEG # BLD: 6 K/UL (ref 1.8–8)
NEUTS SEG NFR BLD: 50 % (ref 32–75)
NRBC # BLD: 0 K/UL (ref 0–0.01)
NRBC BLD-RTO: 0 PER 100 WBC
PLATELET # BLD AUTO: 376 K/UL (ref 150–400)
PMV BLD AUTO: 9.9 FL (ref 8.9–12.9)
POTASSIUM SERPL-SCNC: 3.2 MMOL/L (ref 3.5–5.1)
PROT SERPL-MCNC: 7.3 G/DL (ref 6.4–8.2)
RBC # BLD AUTO: 5.04 M/UL (ref 3.8–5.2)
SODIUM SERPL-SCNC: 139 MMOL/L (ref 136–145)
WBC # BLD AUTO: 12.1 K/UL (ref 3.6–11)

## 2023-09-14 PROCEDURE — 2500000003 HC RX 250 WO HCPCS: Performed by: EMERGENCY MEDICINE

## 2023-09-14 PROCEDURE — 84703 CHORIONIC GONADOTROPIN ASSAY: CPT

## 2023-09-14 PROCEDURE — 99285 EMERGENCY DEPT VISIT HI MDM: CPT

## 2023-09-14 PROCEDURE — 85025 COMPLETE CBC W/AUTO DIFF WBC: CPT

## 2023-09-14 PROCEDURE — 83690 ASSAY OF LIPASE: CPT

## 2023-09-14 PROCEDURE — 80053 COMPREHEN METABOLIC PANEL: CPT

## 2023-09-14 PROCEDURE — 6360000004 HC RX CONTRAST MEDICATION: Performed by: EMERGENCY MEDICINE

## 2023-09-14 PROCEDURE — 2580000003 HC RX 258: Performed by: EMERGENCY MEDICINE

## 2023-09-14 PROCEDURE — A4216 STERILE WATER/SALINE, 10 ML: HCPCS | Performed by: EMERGENCY MEDICINE

## 2023-09-14 PROCEDURE — 74177 CT ABD & PELVIS W/CONTRAST: CPT

## 2023-09-14 PROCEDURE — 6360000002 HC RX W HCPCS

## 2023-09-14 PROCEDURE — 36415 COLL VENOUS BLD VENIPUNCTURE: CPT

## 2023-09-14 PROCEDURE — 6360000002 HC RX W HCPCS: Performed by: EMERGENCY MEDICINE

## 2023-09-14 PROCEDURE — 96375 TX/PRO/DX INJ NEW DRUG ADDON: CPT

## 2023-09-14 PROCEDURE — 96374 THER/PROPH/DIAG INJ IV PUSH: CPT

## 2023-09-14 RX ORDER — KETOROLAC TROMETHAMINE 30 MG/ML
15 INJECTION, SOLUTION INTRAMUSCULAR; INTRAVENOUS ONCE
Status: COMPLETED | OUTPATIENT
Start: 2023-09-14 | End: 2023-09-14

## 2023-09-14 RX ORDER — HYDROXYZINE HYDROCHLORIDE 25 MG/1
25 TABLET, FILM COATED ORAL EVERY 8 HOURS PRN
Qty: 15 TABLET | Refills: 0 | Status: SHIPPED | OUTPATIENT
Start: 2023-09-14 | End: 2023-09-19

## 2023-09-14 RX ORDER — ONDANSETRON 4 MG/1
4 TABLET, ORALLY DISINTEGRATING ORAL ONCE
Status: DISCONTINUED | OUTPATIENT
Start: 2023-09-14 | End: 2023-09-14

## 2023-09-14 RX ORDER — ONDANSETRON 2 MG/ML
INJECTION INTRAMUSCULAR; INTRAVENOUS
Status: COMPLETED
Start: 2023-09-14 | End: 2023-09-14

## 2023-09-14 RX ORDER — MORPHINE SULFATE 4 MG/ML
4 INJECTION, SOLUTION INTRAMUSCULAR; INTRAVENOUS
Status: COMPLETED | OUTPATIENT
Start: 2023-09-14 | End: 2023-09-14

## 2023-09-14 RX ORDER — METHYLPREDNISOLONE SODIUM SUCCINATE 125 MG/2ML
125 INJECTION, POWDER, LYOPHILIZED, FOR SOLUTION INTRAMUSCULAR; INTRAVENOUS ONCE
OUTPATIENT
Start: 2023-09-14 | End: 2023-09-14

## 2023-09-14 RX ORDER — DIPHENHYDRAMINE HYDROCHLORIDE 50 MG/ML
25 INJECTION INTRAMUSCULAR; INTRAVENOUS
Status: COMPLETED | OUTPATIENT
Start: 2023-09-14 | End: 2023-09-14

## 2023-09-14 RX ORDER — 0.9 % SODIUM CHLORIDE 0.9 %
1000 INTRAVENOUS SOLUTION INTRAVENOUS ONCE
Status: COMPLETED | OUTPATIENT
Start: 2023-09-14 | End: 2023-09-14

## 2023-09-14 RX ORDER — ONDANSETRON 2 MG/ML
4 INJECTION INTRAMUSCULAR; INTRAVENOUS ONCE
Status: COMPLETED | OUTPATIENT
Start: 2023-09-14 | End: 2023-09-14

## 2023-09-14 RX ADMIN — SODIUM CHLORIDE 1000 ML: 9 INJECTION, SOLUTION INTRAVENOUS at 01:33

## 2023-09-14 RX ADMIN — MORPHINE SULFATE 4 MG: 4 INJECTION, SOLUTION INTRAMUSCULAR; INTRAVENOUS at 01:32

## 2023-09-14 RX ADMIN — METHYLPREDNISOLONE SODIUM SUCCINATE 125 MG: 125 INJECTION, POWDER, FOR SOLUTION INTRAMUSCULAR; INTRAVENOUS at 01:32

## 2023-09-14 RX ADMIN — IOPAMIDOL 100 ML: 755 INJECTION, SOLUTION INTRAVENOUS at 03:23

## 2023-09-14 RX ADMIN — ONDANSETRON 4 MG: 2 INJECTION INTRAMUSCULAR; INTRAVENOUS at 01:39

## 2023-09-14 RX ADMIN — KETOROLAC TROMETHAMINE 15 MG: 30 INJECTION, SOLUTION INTRAMUSCULAR; INTRAVENOUS at 02:35

## 2023-09-14 RX ADMIN — DIPHENHYDRAMINE HYDROCHLORIDE 25 MG: 50 INJECTION, SOLUTION INTRAMUSCULAR; INTRAVENOUS at 01:07

## 2023-09-14 RX ADMIN — FAMOTIDINE 20 MG: 10 INJECTION, SOLUTION INTRAVENOUS at 01:07

## 2023-09-14 ASSESSMENT — PAIN SCALES - GENERAL: PAINLEVEL_OUTOF10: 0

## 2023-09-14 NOTE — DISCHARGE INSTRUCTIONS
You were evaluated in the emergency department for an acute allergic reaction as well as lower abdominal pain. Your examination was reassuring as was your work-up including blood work, CT scan, and urinalysis. It will be important for you to follow-up with your primary care physician in 3-7 days. If you develop worsening symptoms such as worsening allergic reaction, difficulty breathing, fevers, or worsening abdominal pain, please return to the emergency department immediately.

## 2023-09-14 NOTE — ED PROVIDER NOTES
[facility] EMERGENCY DEPT  EMERGENCY DEPARTMENT ENCOUNTER       Pt Name: Barby Horner  MRN: [id number]  [address] [facility] 1990  Date of evaluation: 9/14/2023  Provider: Rosemary Ch MD   PCP: No primary care provider on file. Note Started: 5:21 AM 9/14/23     CHIEF COMPLAINT       Chief Complaint   Patient presents with    Allergic Reaction     Pt arrives ambulatory to triage for possible allergic reaction. Pt started on new medication (ropinirole) x3 days ago and pt is covered in full body hives that are itchy x2 hours. No other medications PTA. Pt denies SOB at this time         HISTORY OF PRESENT ILLNESS: 1 or more elements      History From: Patient, , History limited by: No limitations     Barby Horner is a 28 y.o. female who presents with chief complaint of hives and acute allergic reaction. Symptoms onset today. She developed general pruritus, erythema, hives all over her body. Denies any new exposures to new soaps, shampoos, detergents. She denies eating anything that she is allergic to. She just started taking ropinirole prescribed by an urgent care center for some leg pain. Denies any shortness of breath or stridor. On my evaluation of the patient she is now complaining of acute periumbilical pain which just darted within the last half hour, this was not present when she was triaged. She states that this is an unfamiliar pain to her and located to the periumbilical region with diffuse radiation throughout the lower abdomen region. Endorses nausea without vomiting, denies fevers, chills, urinary symptoms, recent illness. Nursing Notes were all reviewed and agreed with or any disagreements were addressed in the HPI. REVIEW OF SYSTEMS        Positives and Pertinent negatives as per HPI. PAST HISTORY     Past Medical History:  No past medical history on file. Past Surgical History:  No past surgical history on file. Family History:  No family history on file.     Social History: